# Patient Record
Sex: MALE | NOT HISPANIC OR LATINO | URBAN - METROPOLITAN AREA
[De-identification: names, ages, dates, MRNs, and addresses within clinical notes are randomized per-mention and may not be internally consistent; named-entity substitution may affect disease eponyms.]

---

## 2018-07-03 ENCOUNTER — OFFICE VISIT (OUTPATIENT)
Dept: URBAN - METROPOLITAN AREA CLINIC 32 | Facility: CLINIC | Age: 83
End: 2018-07-03
Payer: MEDICARE

## 2018-07-03 PROCEDURE — 92133 CPTRZD OPH DX IMG PST SGM ON: CPT | Performed by: OPTOMETRIST

## 2018-07-03 PROCEDURE — 99214 OFFICE O/P EST MOD 30 MIN: CPT | Performed by: OPTOMETRIST

## 2018-07-03 RX ORDER — TRAVOPROST 0.04 MG/ML
0.004 % SOLUTION/ DROPS OPHTHALMIC
Qty: 1 | Refills: 6 | Status: INACTIVE
Start: 2018-07-03 | End: 2018-10-25

## 2018-07-03 RX ORDER — BRIMONIDINE TARTRATE, TIMOLOL MALEATE 2; 5 MG/ML; MG/ML
SOLUTION/ DROPS OPHTHALMIC
Qty: 15 | Refills: 3 | Status: INACTIVE
Start: 2018-07-03 | End: 2018-10-25

## 2018-07-03 ASSESSMENT — INTRAOCULAR PRESSURE
OD: 10
OS: 10

## 2018-07-03 NOTE — IMPRESSION/PLAN
Impression: Diagnosis: Low-tension glaucoma, bilateral, mild stage. Code: T19.4723.  Plan: cont Travatan Z QHS OS AT PRN RTC 3 months arcuate defect OU, monitor, TA DE OCT 77/66, incomplete blink inf lid OU, AT QID OS, monitor IOP elevated today

## 2018-10-09 ENCOUNTER — TESTING ONLY (OUTPATIENT)
Dept: URBAN - METROPOLITAN AREA CLINIC 32 | Facility: CLINIC | Age: 83
End: 2018-10-09
Payer: MEDICARE

## 2018-10-09 PROCEDURE — 92133 CPTRZD OPH DX IMG PST SGM ON: CPT | Performed by: OPTOMETRIST

## 2018-10-09 PROCEDURE — 92083 EXTENDED VISUAL FIELD XM: CPT | Performed by: OPTOMETRIST

## 2018-10-25 ENCOUNTER — OFFICE VISIT (OUTPATIENT)
Dept: URBAN - METROPOLITAN AREA CLINIC 32 | Facility: CLINIC | Age: 83
End: 2018-10-25
Payer: MEDICARE

## 2018-10-25 DIAGNOSIS — H40.1231 LOW-TENSION GLAUCOMA, BILATERAL, MILD STAGE: Primary | ICD-10-CM

## 2018-10-25 PROCEDURE — 99214 OFFICE O/P EST MOD 30 MIN: CPT | Performed by: OPTOMETRIST

## 2018-10-25 RX ORDER — BRIMONIDINE TARTRATE, TIMOLOL MALEATE 2; 5 MG/ML; MG/ML
SOLUTION/ DROPS OPHTHALMIC
Qty: 15 | Refills: 3 | Status: INACTIVE
Start: 2018-10-25 | End: 2019-01-22

## 2018-10-25 RX ORDER — TRAVOPROST 0.04 MG/ML
0.004 % SOLUTION/ DROPS OPHTHALMIC
Qty: 1 | Refills: 6 | Status: INACTIVE
Start: 2018-10-25 | End: 2019-01-22

## 2018-10-25 RX ORDER — BRIMONIDINE TARTRATE, TIMOLOL MALEATE 2; 5 MG/ML; MG/ML
SOLUTION/ DROPS OPHTHALMIC
Qty: 5 | Refills: 3 | Status: INACTIVE
Start: 2018-10-25 | End: 2019-01-23

## 2018-10-25 ASSESSMENT — INTRAOCULAR PRESSURE
OD: 16
OS: 15

## 2018-10-25 NOTE — IMPRESSION/PLAN
Impression: Diagnosis: Low-tension glaucoma, bilateral, mild stage. Code: C13.4016.  Plan: cont combigan BID OU, Travatan Z QHS OS AT PRN RTC 3 months arcuate defect OU, monitor, TA DE OCT 77/66, incomplete blink inf lid OU, AT QID OS, monitor IOP elevated today

## 2019-04-16 ENCOUNTER — OFFICE VISIT (OUTPATIENT)
Dept: URBAN - METROPOLITAN AREA CLINIC 32 | Facility: CLINIC | Age: 84
End: 2019-04-16
Payer: MEDICARE

## 2019-04-16 DIAGNOSIS — H35.363 DRUSEN (DEGENERATIVE) OF MACULA, BILATERAL: ICD-10-CM

## 2019-04-16 PROCEDURE — 99214 OFFICE O/P EST MOD 30 MIN: CPT | Performed by: OPTOMETRIST

## 2019-04-16 RX ORDER — TRAVOPROST 0.04 MG/ML
0.004 % SOLUTION/ DROPS OPHTHALMIC
Qty: 5 | Refills: 3 | Status: INACTIVE
Start: 2019-04-16 | End: 2019-08-08

## 2019-04-16 RX ORDER — BRIMONIDINE TARTRATE, TIMOLOL MALEATE 2; 5 MG/ML; MG/ML
SOLUTION/ DROPS OPHTHALMIC
Qty: 5 | Refills: 3 | Status: INACTIVE
Start: 2019-04-16 | End: 2019-08-08

## 2019-04-16 RX ORDER — TRAVOPROST 0.04 MG/ML
0.004 % SOLUTION/ DROPS OPHTHALMIC
Qty: 5 | Refills: 3 | Status: INACTIVE
Start: 2019-04-16 | End: 2019-04-16

## 2019-04-16 RX ORDER — BRIMONIDINE TARTRATE, TIMOLOL MALEATE 2; 5 MG/ML; MG/ML
SOLUTION/ DROPS OPHTHALMIC
Qty: 15 | Refills: 3 | Status: INACTIVE
Start: 2019-04-16 | End: 2019-07-14

## 2019-04-16 ASSESSMENT — INTRAOCULAR PRESSURE
OS: 17
OD: 12

## 2019-04-16 NOTE — IMPRESSION/PLAN
Impression: Diagnosis: Low-tension glaucoma, bilateral, mild stage. Code: I21.9908.  Plan: cont combigan BID OU, Travatan Z QHS OS AT PRN RTC 3 months arcuate defect OU, monitor, TA DE OCT 77/66, incomplete blink inf lid OU, AT QID OS, monitor IOP elevated today

## 2019-04-16 NOTE — IMPRESSION/PLAN
Impression: Drusen (degenerative) of macula, bilateral: H35.363. Plan: Macular degeneration, dry type with stable vision. Will continue to monitor vision and the patient has been instructed to call with any vision changes.

## 2019-08-08 ENCOUNTER — OFFICE VISIT (OUTPATIENT)
Dept: URBAN - METROPOLITAN AREA CLINIC 32 | Facility: CLINIC | Age: 84
End: 2019-08-08
Payer: MEDICARE

## 2019-08-08 DIAGNOSIS — H40.1212 LOW-TENSION GLAUCOMA, RIGHT EYE, MODERATE STAGE: ICD-10-CM

## 2019-08-08 PROCEDURE — 92020 GONIOSCOPY: CPT | Performed by: OPHTHALMOLOGY

## 2019-08-08 PROCEDURE — 76514 ECHO EXAM OF EYE THICKNESS: CPT | Performed by: OPHTHALMOLOGY

## 2019-08-08 PROCEDURE — 92014 COMPRE OPH EXAM EST PT 1/>: CPT | Performed by: OPHTHALMOLOGY

## 2019-08-08 RX ORDER — PREDNISOLONE ACETATE 10 MG/ML
1 % SUSPENSION/ DROPS OPHTHALMIC
Qty: 2.5 | Refills: 0 | Status: ACTIVE
Start: 2019-08-08

## 2019-08-08 RX ORDER — TRAVOPROST 0.04 MG/ML
0.004 % SOLUTION/ DROPS OPHTHALMIC
Qty: 5 | Refills: 8 | Status: INACTIVE
Start: 2019-08-08 | End: 2020-01-09

## 2019-08-08 RX ORDER — BRIMONIDINE TARTRATE, TIMOLOL MALEATE 2; 5 MG/ML; MG/ML
SOLUTION/ DROPS OPHTHALMIC
Qty: 5 | Refills: 3 | Status: INACTIVE
Start: 2019-08-08 | End: 2020-01-09

## 2019-08-08 ASSESSMENT — INTRAOCULAR PRESSURE
OD: 16
OS: 15

## 2019-08-08 NOTE — IMPRESSION/PLAN
Impression: Low-tension glaucoma, left eye, severe stage: T07.6605. OS.
- IOP borderline ou - 
thin CCT ou may falsely decrease IOP readings -
ONH large CD ratio ou - Plan: Discussed diagnosis, IOP, ONH, glaucoma management and risks. continue combigan BID OU, Travatan Z QHS OS. Recommends ALT OS to lower IOP. RL=2 Discussed RBA's and laser procedure. Patient elects ALT OS.
start prednisolone OS qid x 7 days after laser - Will continue to monitor condition and symptoms.

## 2019-08-28 ENCOUNTER — SURGERY (OUTPATIENT)
Dept: URBAN - METROPOLITAN AREA SURGERY 11 | Facility: SURGERY | Age: 84
End: 2019-08-28
Payer: MEDICARE

## 2019-08-28 PROCEDURE — 65855 TRABECULOPLASTY LASER SURG: CPT | Performed by: OPHTHALMOLOGY

## 2019-09-04 ENCOUNTER — POST-OPERATIVE VISIT (OUTPATIENT)
Dept: URBAN - METROPOLITAN AREA CLINIC 32 | Facility: CLINIC | Age: 84
End: 2019-09-04

## 2019-09-04 DIAGNOSIS — Z09 ENCNTR FOR F/U EXAM AFT TRTMT FOR COND OTH THAN MALIG NEOPLM: Primary | ICD-10-CM

## 2019-09-04 ASSESSMENT — INTRAOCULAR PRESSURE
OS: 9
OD: 8

## 2019-10-07 ENCOUNTER — POST-OPERATIVE VISIT (OUTPATIENT)
Dept: URBAN - METROPOLITAN AREA CLINIC 32 | Facility: CLINIC | Age: 84
End: 2019-10-07

## 2019-10-07 RX ORDER — CARBOXYMETHYLCELLULOSE SODIUM 5 MG/ML
0.5 % SOLUTION/ DROPS OPHTHALMIC
Qty: 15 | Refills: 9 | Status: INACTIVE
Start: 2019-10-07 | End: 2019-11-05

## 2019-10-07 ASSESSMENT — INTRAOCULAR PRESSURE
OS: 10
OD: 9

## 2020-01-09 ENCOUNTER — OFFICE VISIT (OUTPATIENT)
Dept: URBAN - METROPOLITAN AREA CLINIC 32 | Facility: CLINIC | Age: 85
End: 2020-01-09
Payer: MEDICARE

## 2020-01-09 DIAGNOSIS — H40.1223 LOW-TENSION GLAUCOMA, LEFT EYE, SEVERE STAGE: ICD-10-CM

## 2020-01-09 PROCEDURE — 99214 OFFICE O/P EST MOD 30 MIN: CPT | Performed by: OPTOMETRIST

## 2020-01-09 RX ORDER — BRIMONIDINE TARTRATE, TIMOLOL MALEATE 2; 5 MG/ML; MG/ML
SOLUTION/ DROPS OPHTHALMIC
Qty: 5 | Refills: 3 | Status: INACTIVE
Start: 2020-01-09 | End: 2020-05-15

## 2020-01-09 RX ORDER — TRAVOPROST 0.04 MG/ML
0.004 % SOLUTION/ DROPS OPHTHALMIC
Qty: 5 | Refills: 8 | Status: INACTIVE
Start: 2020-01-09 | End: 2020-03-04

## 2020-01-09 RX ORDER — CARBOXYMETHYLCELLULOSE SODIUM 10 MG/ML
1 % GEL OPHTHALMIC
Qty: 15 | Refills: 9 | Status: ACTIVE
Start: 2020-01-09

## 2020-01-09 RX ORDER — CARBOXYMETHYLCELLULOSE SODIUM 10 MG/ML
1 % GEL OPHTHALMIC
Qty: 15 | Refills: 9 | Status: INACTIVE
Start: 2020-01-09 | End: 2020-01-09

## 2020-01-09 ASSESSMENT — INTRAOCULAR PRESSURE
OD: 15
OS: 16

## 2020-01-09 NOTE — IMPRESSION/PLAN
Impression: Low-tension glaucoma, left eye, severe stage: G74.2411. OS.
- IOP borderline ou - 
thin CCT ou may falsely decrease IOP readings -
ONH large CD ratio ou - Plan: Discussed diagnosis, IOP, ONH, glaucoma management and risks. continue combigan BID OU, Travatan Z QHS OS. Recommends ALT OS to lower IOP. RL=2 Discussed RBA's and laser procedure. Patient elects ALT OS.
start prednisolone OS qid x 7 days after laser - Will continue to monitor condition and symptoms.

## 2021-01-01 NOTE — IMPRESSION/PLAN
Impression: Low-tension glaucoma, right eye, moderate stage: D41.0557. OD.  Plan: see notes #1 Improved

## 2022-06-04 ENCOUNTER — TELEPHONE ENCOUNTER (OUTPATIENT)
Dept: URBAN - METROPOLITAN AREA CLINIC 68 | Facility: CLINIC | Age: 87
End: 2022-06-04

## 2022-06-05 ENCOUNTER — TELEPHONE ENCOUNTER (OUTPATIENT)
Dept: URBAN - METROPOLITAN AREA CLINIC 68 | Facility: CLINIC | Age: 87
End: 2022-06-05

## 2022-06-25 ENCOUNTER — TELEPHONE ENCOUNTER (OUTPATIENT)
Age: 87
End: 2022-06-25

## 2022-06-26 ENCOUNTER — TELEPHONE ENCOUNTER (OUTPATIENT)
Age: 87
End: 2022-06-26